# Patient Record
Sex: FEMALE | Race: WHITE | URBAN - NONMETROPOLITAN AREA
[De-identification: names, ages, dates, MRNs, and addresses within clinical notes are randomized per-mention and may not be internally consistent; named-entity substitution may affect disease eponyms.]

---

## 2021-02-26 ENCOUNTER — IMMUNIZATION (OUTPATIENT)
Dept: PRIMARY CARE CLINIC | Age: 71
End: 2021-02-26
Payer: MEDICARE

## 2021-02-26 PROCEDURE — 0001A COVID-19, PFIZER VACCINE 30MCG/0.3ML DOSE: CPT | Performed by: FAMILY MEDICINE

## 2021-02-26 PROCEDURE — 91300 COVID-19, PFIZER VACCINE 30MCG/0.3ML DOSE: CPT | Performed by: FAMILY MEDICINE

## 2021-03-19 ENCOUNTER — IMMUNIZATION (OUTPATIENT)
Dept: PRIMARY CARE CLINIC | Age: 71
End: 2021-03-19
Payer: MEDICARE

## 2021-03-19 PROCEDURE — 0002A COVID-19, PFIZER VACCINE 30MCG/0.3ML DOSE: CPT

## 2021-03-19 PROCEDURE — 91300 COVID-19, PFIZER VACCINE 30MCG/0.3ML DOSE: CPT

## 2023-03-21 ENCOUNTER — HOSPITAL ENCOUNTER (EMERGENCY)
Age: 73
Discharge: HOME | End: 2023-03-21
Payer: MEDICARE

## 2023-03-21 VITALS
OXYGEN SATURATION: 100 % | SYSTOLIC BLOOD PRESSURE: 103 MMHG | HEART RATE: 75 BPM | DIASTOLIC BLOOD PRESSURE: 84 MMHG | RESPIRATION RATE: 20 BRPM | TEMPERATURE: 98.2 F

## 2023-03-21 DIAGNOSIS — J02.0 STREPTOCOCCAL SORE THROAT: Primary | ICD-10-CM

## 2023-03-21 LAB — S PYO AG THROAT QL: POSITIVE

## 2023-03-21 PROCEDURE — 87651 STREP A DNA AMP PROBE: CPT

## 2023-03-21 PROCEDURE — 99213 OFFICE O/P EST LOW 20 MIN: CPT | Performed by: NURSE PRACTITIONER

## 2023-03-21 PROCEDURE — 99213 OFFICE O/P EST LOW 20 MIN: CPT

## 2023-03-21 RX ORDER — LOSARTAN POTASSIUM 50 MG/1
50 TABLET ORAL DAILY
COMMUNITY
Start: 2023-02-24

## 2023-03-21 RX ORDER — SIMVASTATIN 20 MG
20 TABLET ORAL EVERY EVENING
COMMUNITY
Start: 2022-12-29

## 2023-03-21 RX ORDER — TRAMADOL HYDROCHLORIDE 50 MG/1
50 TABLET ORAL 3 TIMES DAILY PRN
COMMUNITY
Start: 2023-02-24

## 2023-03-21 RX ORDER — AMOXICILLIN 500 MG/1
500 CAPSULE ORAL 2 TIMES DAILY
Qty: 20 CAPSULE | Refills: 0 | Status: SHIPPED | OUTPATIENT
Start: 2023-03-21 | End: 2023-03-31

## 2023-03-21 RX ORDER — TRAZODONE HYDROCHLORIDE 100 MG/1
100 TABLET ORAL NIGHTLY
COMMUNITY
Start: 2023-02-15

## 2023-03-21 ASSESSMENT — ENCOUNTER SYMPTOMS
RHINORRHEA: 0
SORE THROAT: 1
COUGH: 0
VOMITING: 0
DIARRHEA: 0
CHEST TIGHTNESS: 0
NAUSEA: 0
SHORTNESS OF BREATH: 0

## 2023-03-21 NOTE — ED PROVIDER NOTES
DIAGNOSTIC RESULTS     Labs:  Results for orders placed or performed during the hospital encounter of 03/21/23   Strep Screen Group A Throat   Result Value Ref Range    Rapid Strep A Screen POSITIVE (A)        IMAGING:    No orders to display         EKG: None      URGENT CARE COURSE:     Vitals:    03/21/23 1730   BP: 103/84   Pulse: 75   Resp: 20   Temp: 98.2 °F (36.8 °C)   SpO2: 100%       Medications - No data to display         PROCEDURES:  None    FINAL IMPRESSION      1. Streptococcal sore throat          DISPOSITION/ PLAN     Patient seen and evaluated for the above symptoms. Assessment consistent with streptococcal pharyngitis. Patient is provided a prescription for amoxicillin. Instructed to use warm salt water gargle, Chloraseptic spray, or cough drops. Instructed to push oral fluids. The Patient is instructed to use over-the-counter Tylenol and Motrin for pain or fever. Instructed to follow-up with their PCP in 3 to 5 days and worsening symptoms. The patient is agreeable with the above plan and denies questions or concerns at this time.         PATIENT REFERRED TO:  TIARRA Nixon CNP  [address] [facility] [place] [address]      DISCHARGE MEDICATIONS:  New Prescriptions    AMOXICILLIN (AMOXIL) 500 MG CAPSULE    Take 1 capsule by mouth 2 times daily for 10 days       Discontinued Medications    No medications on file       Current Discharge Medication List          TIARRA Chung CNP    (Please note that portions of this note were completed with a voice recognition program. Efforts were made to edit the dictations but occasionally words are mis-transcribed.)            TIARRA Chung CNP  03/21/23 1760